# Patient Record
Sex: MALE | Race: WHITE | NOT HISPANIC OR LATINO | Employment: FULL TIME | URBAN - METROPOLITAN AREA
[De-identification: names, ages, dates, MRNs, and addresses within clinical notes are randomized per-mention and may not be internally consistent; named-entity substitution may affect disease eponyms.]

---

## 2017-03-12 ENCOUNTER — APPOINTMENT (OUTPATIENT)
Dept: GENERAL RADIOLOGY | Facility: CLINIC | Age: 47
End: 2017-03-12
Attending: EMERGENCY MEDICINE
Payer: COMMERCIAL

## 2017-03-12 ENCOUNTER — HOSPITAL ENCOUNTER (EMERGENCY)
Facility: CLINIC | Age: 47
Discharge: HOME | End: 2017-03-12
Attending: EMERGENCY MEDICINE | Admitting: EMERGENCY MEDICINE
Payer: COMMERCIAL

## 2017-03-12 VITALS
WEIGHT: 200 LBS | DIASTOLIC BLOOD PRESSURE: 121 MMHG | TEMPERATURE: 98.1 F | RESPIRATION RATE: 16 BRPM | HEART RATE: 109 BPM | SYSTOLIC BLOOD PRESSURE: 178 MMHG | OXYGEN SATURATION: 95 %
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
## 2017-03-12 DIAGNOSIS — S22.41XA CLOSED FRACTURE OF MULTIPLE RIBS OF RIGHT SIDE, INITIAL ENCOUNTER: ICD-10-CM

## 2017-03-12 PROCEDURE — 99284 EMERGENCY DEPT VISIT MOD MDM: CPT | Performed by: EMERGENCY MEDICINE

## 2017-03-12 PROCEDURE — 99283 EMERGENCY DEPT VISIT LOW MDM: CPT | Mod: 25

## 2017-03-12 PROCEDURE — 71010 XR CHEST 1 VW: CPT | Mod: TC

## 2017-03-12 PROCEDURE — 94640 AIRWAY INHALATION TREATMENT: CPT

## 2017-03-12 PROCEDURE — 25000125 ZZHC RX 250: Performed by: EMERGENCY MEDICINE

## 2017-03-12 PROCEDURE — 40000275 ZZH STATISTIC RCP TIME EA 10 MIN

## 2017-03-12 PROCEDURE — 25000132 ZZH RX MED GY IP 250 OP 250 PS 637: Performed by: EMERGENCY MEDICINE

## 2017-03-12 PROCEDURE — 71101 X-RAY EXAM UNILAT RIBS/CHEST: CPT | Mod: TC,RT

## 2017-03-12 RX ORDER — LIDOCAINE 50 MG/G
1 PATCH TOPICAL EVERY 24 HOURS
Qty: 10 PATCH | Refills: 0 | Status: SHIPPED | OUTPATIENT
Start: 2017-03-12 | End: 2017-03-22

## 2017-03-12 RX ORDER — ALBUTEROL SULFATE 90 UG/1
2 AEROSOL, METERED RESPIRATORY (INHALATION) EVERY 4 HOURS PRN
Qty: 1 INHALER | Refills: 0 | Status: SHIPPED | OUTPATIENT
Start: 2017-03-12

## 2017-03-12 RX ORDER — HYDROCODONE BITARTRATE AND ACETAMINOPHEN 5; 325 MG/1; MG/1
2 TABLET ORAL ONCE
Status: COMPLETED | OUTPATIENT
Start: 2017-03-12 | End: 2017-03-12

## 2017-03-12 RX ORDER — IPRATROPIUM BROMIDE AND ALBUTEROL SULFATE 2.5; .5 MG/3ML; MG/3ML
3 SOLUTION RESPIRATORY (INHALATION) ONCE
Status: COMPLETED | OUTPATIENT
Start: 2017-03-12 | End: 2017-03-12

## 2017-03-12 RX ORDER — HYDROCODONE BITARTRATE AND ACETAMINOPHEN 5; 325 MG/1; MG/1
1-2 TABLET ORAL EVERY 6 HOURS PRN
Qty: 20 TABLET | Refills: 0 | Status: SHIPPED | OUTPATIENT
Start: 2017-03-12 | End: 2017-03-17

## 2017-03-12 RX ORDER — LIDOCAINE 50 MG/G
1 PATCH TOPICAL ONCE
Status: COMPLETED | OUTPATIENT
Start: 2017-03-12 | End: 2017-03-12

## 2017-03-12 RX ADMIN — LIDOCAINE 1 PATCH: 50 PATCH CUTANEOUS at 09:50

## 2017-03-12 RX ADMIN — HYDROCODONE BITARTRATE AND ACETAMINOPHEN 2 TABLET: 5; 325 TABLET ORAL at 09:15

## 2017-03-12 RX ADMIN — IPRATROPIUM BROMIDE AND ALBUTEROL SULFATE 3 ML: .5; 3 SOLUTION RESPIRATORY (INHALATION) at 09:26

## 2017-03-12 NOTE — PROGRESS NOTES
S  Respiratory Care B  Rib Fx A  Duoneb treatment given with some increase in aeration; Sa02 %. Incentive Spirometry ordered. R  Instructed patient on use of IS. Fair effort and able to achieve 1000 ml. Advised patient to perform maneuver hourly to stimulate cough and deep breathing.

## 2017-03-12 NOTE — ED AVS SNAPSHOT
[facility] Emergency Department    [address] [place] [address]    Phone:  [phone]    Fax:  [phone]                                       Osvaldo Mitchell   MRN: [id number]    Department:  [facility] Emergency Department   Date of Visit:  3/12/2017           After Visit Summary Signature Page     I have received my discharge instructions, and my questions have been answered. I have discussed any challenges I see with this plan with the nurse or doctor.    ..........................................................................................................................................  Patient/Patient Representative Signature      ..........................................................................................................................................  Patient Representative Print Name and Relationship to Patient    ..................................................               ................................................  Date                                            Time    ..........................................................................................................................................  Reviewed by Signature/Title    ...................................................              ..............................................  Date                                                            Time

## 2017-03-12 NOTE — LETTER
[facility] EMERGENCY DEPARTMENT  [address] [place] [address]  [phone]    2017    Osvaldo Mitchell  [address] [facility]  [place] [address] [phone] (home) [phone] (work)    : 1970      To Whom it may concern:    Osvaldo Mitchell was seen in our Emergency Department today, 2017.  He will need follow-up with clinic provider this week.  He may return to work after cleared in clinic.  Not lifting > 10 lbs.     Sincerely,        Cristina Glover MD

## 2017-03-12 NOTE — ED AVS SNAPSHOT
[facility] Emergency Department    [address] [place] [address]    Phone:  [phone]    Fax:  [phone]                                       Osvaldo Mitchell   MRN: [id number]    Department:  [facility] Emergency Department   Date of Visit:  3/12/2017           Patient Information     Date Of Birth          1970        Your diagnoses for this visit were:     Closed fracture of multiple ribs of right side, initial encounter     Fall at home, initial encounter        You were seen by Cristina Glover MD.      Follow-up Information     Follow up with Clinic, [facility].    Contact information:    [phone]          Discharge Instructions       There are 2 broken ribs.    Vicodin if needed for pain.  This should also help with cough.  It may cause constipation.  Start stool softeners or Miralax for constipation.    Ibuprofen or Aleve for pain and inflammation.    You can try ice or heat to the painful area.  Use the rib belt for comfort if desired.    Use the incentive spirometer to prevent any mild lung collapse or pneumonia.    Inhalers as needed.    Try a Lidoderm patch over the area for pain. If your insurance does not cover this, you can buy them over-the-counter. Talk with your pharmacist.    This is the perfect time to stop smoking.    See clinic provider this week.    Return for concerns.    I hope you heal quickly!!          Discharge References/Attachments     RIB FRACTURE (ENGLISH)      Future Appointments        Provider Department Dept Phone Center    3/17/2017 1:00 PM Isauro Snow MD [facility] [phone] [facility]      24 Hour Appointment Hotline       To make an appointment at any [facility], call [phone] ([phone]). If you don't have a family doctor or clinic, we will help you find one. [facility] are conveniently located to serve the needs of you and your family.          ED  Discharge Orders     RIB BELT ELASTIC - MALE M                    Review of your medicines      START taking        Dose / Directions Last dose taken    albuterol 108 (90 BASE) MCG/ACT Inhaler   Commonly known as:  albuterol   Dose:  2 puff   Quantity:  1 Inhaler        Inhale 2 puffs into the lungs every 4 hours as needed for shortness of breath / dyspnea   Refills:  0        HYDROcodone-acetaminophen 5-325 MG per tablet   Commonly known as:  NORCO   Dose:  1-2 tablet   Quantity:  20 tablet        Take 1-2 tablets by mouth every 6 hours as needed for moderate to severe pain   Refills:  0        lidocaine 5 % Patch   Commonly known as:  LIDODERM   Dose:  1 patch   Quantity:  10 patch        Place 1 patch onto the skin every 24 hours for 10 days   Refills:  0          Our records show that you are taking the medicines listed below. If these are incorrect, please call your family doctor or clinic.        Dose / Directions Last dose taken    ALEVE PO        Refills:  0        CLARITIN 10 MG tablet   Quantity:  30   Generic drug:  loratadine        1 TAB PO QD (Once per day) as needed for ALLERGY SYMPTOMS   Refills:  0        IBUPROFEN PO        Refills:  0        RHINOCORT AQUA 32 MCG/ACT spray   Quantity:  1   Generic drug:  budesonide        2 sprays each nostril qam and qpm   Refills:  0                Prescriptions were sent or printed at these locations (3 Prescriptions)                   [facility] [place], [place] - [address] [place] [address]    [address] , [facility] [address]    Telephone:  [phone]   Fax:  [phone]   Hours:                  E-Prescribed (2 of 3)         lidocaine (LIDODERM) 5 % Patch               albuterol (ALBUTEROL) 108 (90 BASE) MCG/ACT Inhaler                 Printed at Department/Unit printer (1 of 3)         HYDROcodone-acetaminophen (NORCO) 5-325 MG per tablet                Procedures and tests performed during your visit     Incentive spirometry nursing    Ribs XR,  "unilat 3 views + PA chest, right    XR Chest 1 View      Orders Needing Specimen Collection     None      Pending Results     No orders found from 3/10/2017 to 3/13/2017.            Pending Culture Results     No orders found from 3/10/2017 to 3/13/2017.            Thank you for choosing [facility]       Thank you for choosing [facility] for your care. Our goal is always to provide you with excellent care. Hearing back from our patients is one way we can continue to improve our services. Please take a few minutes to complete the written survey that you may receive in the mail after you visit with us. Thank you!        AirInSpace[employer] TV Information     Yobongo lets you send messages to your doctor, view your test results, renew your prescriptions, schedule appointments and more. To sign up, go to [email or web address] . Click on \"Log in\" on the left side of the screen, which will take you to the Welcome page. Then click on \"Sign up Now\" on the right side of the page.     You will be asked to enter the access code listed below, as well as some personal information. Please follow the directions to create your username and password.     Your access code is: [email or web address]  Expires: 6/10/2017 10:25 AM     Your access code will  in 90 days. If you need help or a new code, please call your [facility] clinic or [phone].        Care EveryWhere ID     This is your Care EveryWhere ID. This could be used by other organizations to access your [facility] medical records  [id number]        After Visit Summary       This is your record. Keep this with you and show to your community pharmacist(s) and doctor(s) at your next visit.                  "

## 2017-03-12 NOTE — DISCHARGE INSTRUCTIONS
There are 2 broken ribs.    Vicodin if needed for pain.  This should also help with cough.  It may cause constipation.  Start stool softeners or Miralax for constipation.    Ibuprofen or Aleve for pain and inflammation.    You can try ice or heat to the painful area.  Use the rib belt for comfort if desired.    Use the incentive spirometer to prevent any mild lung collapse or pneumonia.    Inhalers as needed.    Try a Lidoderm patch over the area for pain. If your insurance does not cover this, you can buy them over-the-counter. Talk with your pharmacist.    This is the perfect time to stop smoking.    See clinic provider this week.    Return for concerns.    I hope you heal quickly!!

## 2017-03-12 NOTE — ED PROVIDER NOTES
History     Chief Complaint   Patient presents with     Fall     Back Pain     The history is provided by the patient and medical records.     This is a 46-year-old male, basically healthy, presenting with rib pain after a fall. Patient slipped on some stairs 2 nights ago and fell onto his right side. He notes pain in the posterior ribs. He has used ibuprofen and Aleve with only mild relief. He has had difficulty coughing and notes pain with movement, breathing, coughing, sitting. Because it hurts to move and sit he has had some constipation. He denies any urinary symptoms or blood in the urine. No fevers or chills. No other injuries.  He has had some nasal congestion and is using a nasal spray. Patient smokes pack per day.    I have reviewed the Medications, Allergies, Past Medical and Surgical History, and Social History in the Epic system.    Review of Systems   All other ROS reviewed and are negative or non-contributory except as stated in HPI.     Physical Exam   BP: (!) 178/121  Pulse: 105  Temp: 98.1  F (36.7  C)  Resp: 16  Weight: 90.7 kg (200 lb)  SpO2: 95 %  Physical Exam   Constitutional: He appears well-developed and well-nourished.   Tall, thin male landing in the room   HENT:   Head: Normocephalic.   Nose: Nose normal.   Eyes: Conjunctivae and EOM are normal.   Neck: Normal range of motion.   Cardiovascular: Regular rhythm, normal heart sounds and intact distal pulses.    Tachycardia   Pulmonary/Chest:           Rhonchi, tight breath sounds, end expiratory wheeze.  Splinting   Abdominal: Soft. There is no tenderness.   Musculoskeletal: Normal range of motion.   Neurological: He is alert.   Skin: Skin is warm and dry. He is not diaphoretic.   Psychiatric: He has a normal mood and affect. His behavior is normal.   Vitals reviewed.      ED Course (with Medical Decision Making)    Pt seen and examined by me.  RN and EPIC notes reviewed.      Patient with fall and rib pain. I suspect he may have a rib  fracture. He also is a smoker and has a lot of rhonchi and tight breath sounds. Plan to get x-ray, Vicodin for pain, DuoNeb.     Patient had some improvement in his breathing after the neb. X-rays were done which show 2 mildly displaced fractures.  By my read, there may also be a nondisplaced fracture on the right 8th rib. Results discussed with the patient. Lidoderm patch placed over the area of greatest pain.  We discussed incentive spirometer and one was provided for him.     Plan is for discharge home. Rest, ice or heat to the painful area. Lidoderm patch. Albuterol inhaler as needed for shortness of breath and cough. Vicodin for severe pain. Okay to add ibuprofen or Aleve as needed. He was also given a rib belt to use for comfort as needed. Work note provided. He needs a follow-up visit to primary care provider for further work restrictions.        Procedures  Results for orders placed or performed during the hospital encounter of 03/12/17   Ribs XR, unilat 3 views + PA chest, right    Narrative    XR RIBS & CHEST RT 3VW, XR CHEST 1 VW 3/12/2017 9:24 AM     HISTORY: fall; pain    COMPARISON: None      Impression    IMPRESSION: There are mildly displaced fractures of the right ninth  and 10th ribs. There is no evidence of pneumothorax. Small amount of  atelectasis at the right lung base. The left lung is clear.    BAILEY SOUSA MD   XR Chest 1 View    Narrative    XR RIBS & CHEST RT 3VW, XR CHEST 1 VW 3/12/2017 9:24 AM     HISTORY: fall; pain    COMPARISON: None      Impression    IMPRESSION: There are mildly displaced fractures of the right ninth  and 10th ribs. There is no evidence of pneumothorax. Small amount of  atelectasis at the right lung base. The left lung is clear.    BAILEY SOUSA MD        Assessments & Plan     I have reviewed the findings, diagnosis, plan and need for follow up with the patient.    Discharge Medication List as of 3/12/2017 10:53 AM      START taking these medications    Details    HYDROcodone-acetaminophen (NORCO) 5-325 MG per tablet Take 1-2 tablets by mouth every 6 hours as needed for moderate to severe pain, Disp-20 tablet, R-0, Local Print      lidocaine (LIDODERM) 5 % Patch Place 1 patch onto the skin every 24 hours for 10 daysDisp-10 patch, [id number]      albuterol (ALBUTEROL) 108 (90 BASE) MCG/ACT Inhaler Inhale 2 puffs into the lungs every 4 hours as needed for shortness of breath / dyspnea, Disp-1 Inhaler, R-0, E-Prescribe             Final diagnoses:   Closed fracture of multiple ribs of right side, initial encounter   Fall at home, initial encounter     Disposition: Patient discharged home in stable condition in the care of family. Plan as above. Return for concerns. Smoking cessation discussed.    Note: Chart documentation done in part with Dragon Voice Recognition software. Although reviewed after completion, some word and grammatical errors may remain.     3/12/2017   [facility] EMERGENCY DEPARTMENT     Cristina Glover MD  03/12/17 3060

## 2017-03-17 ENCOUNTER — OFFICE VISIT (OUTPATIENT)
Dept: FAMILY MEDICINE | Facility: CLINIC | Age: 47
End: 2017-03-17
Payer: COMMERCIAL

## 2017-03-17 VITALS
OXYGEN SATURATION: 99 % | TEMPERATURE: 97.8 F | DIASTOLIC BLOOD PRESSURE: 88 MMHG | WEIGHT: 201 LBS | HEART RATE: 84 BPM | SYSTOLIC BLOOD PRESSURE: 144 MMHG | RESPIRATION RATE: 18 BRPM

## 2017-03-17 DIAGNOSIS — S22.41XD CLOSED FRACTURE OF MULTIPLE RIBS OF RIGHT SIDE WITH ROUTINE HEALING, SUBSEQUENT ENCOUNTER: Primary | ICD-10-CM

## 2017-03-17 PROCEDURE — 99213 OFFICE O/P EST LOW 20 MIN: CPT | Performed by: FAMILY MEDICINE

## 2017-03-17 RX ORDER — AZITHROMYCIN 250 MG/1
TABLET, FILM COATED ORAL
Qty: 6 TABLET | Refills: 0 | Status: SHIPPED | OUTPATIENT
Start: 2017-03-17

## 2017-03-17 RX ORDER — HYDROCODONE BITARTRATE AND ACETAMINOPHEN 5; 325 MG/1; MG/1
1-2 TABLET ORAL EVERY 6 HOURS PRN
Qty: 30 TABLET | Refills: 0 | Status: SHIPPED | OUTPATIENT
Start: 2017-03-17

## 2017-03-17 NOTE — LETTER
[address] [address]  Phone: [phone]  Fax: [phone]    March 17, 2017        Osvaldo Mitchell  [address] [address] [place] [address]          To whom it may concern:    RE: Osvaldo Mitchell    Patient was seen and treated today at our clinic and missed work. Please continue to excuse him from work through March 24.    Please contact me for questions or concerns.      Sincerely,        Isauro Snow MD

## 2017-03-17 NOTE — NURSING NOTE
Chief Complaint   Patient presents with     Hospital F/U       Initial /88 (Cuff Size: Adult Regular)  Pulse 84  Temp 97.8  F (36.6  C) (Temporal)  Resp 18  Wt 201 lb (91.2 kg)  SpO2 99% There is no height or weight on file to calculate BMI.  Medication Reconciliation: complete

## 2017-03-17 NOTE — PROGRESS NOTES
SUBJECTIVE:                                                    Osvaldo Mitchell is a 46 year old male who presents to clinic today for the following health issues:      ED/UC Followup:    Facility:  [facility]  Date of visit: 3/12/17  Reason for visit: Fall with broken ribs  Current Status: Improved some, but he has had a cough so it is causing a lot of pain           Problem list and histories reviewed & adjusted, as indicated.  Additional history: as documented        Reviewed and updated as needed this visit by clinical staff  Tobacco  Allergies  Meds  Soc Hx      Reviewed and updated as needed this visit by Provider        SUBJECTIVE:  Osvaldo  is a 46 year old male who presents for:  Follow-up of broken ribs. A week ago he fell down some steps in 2 days later presented to the emergency room in pain and it was found out he had a nondisplaced fracture of ninth and 10th rib on the right side. He is today out of pain medication he was on some Vicodin and has developed more of a cough. He did have some trouble with constipation but has been using stool softeners and laxatives. No fever. No shortness of breath. Doesn't feel he is able to go back to work yet it's hard for him to get in and out of a car and even bend over to tie his shoes.    No past medical history on file.  No past surgical history on file.  Social History   Substance Use Topics     Smoking status: Current Every Day Smoker     Packs/day: 1.00     Smokeless tobacco: Never Used     Alcohol use 13.2 oz/week     22 Glasses of wine per week     Current Outpatient Prescriptions   Medication Sig Dispense Refill     HYDROcodone-acetaminophen (NORCO) 5-325 MG per tablet Take 1-2 tablets by mouth every 6 hours as needed for moderate to severe pain 30 tablet 0     azithromycin (ZITHROMAX) 250 MG tablet Two tablets first day, then one tablet daily for four days. 6 tablet 0     Naproxen Sodium (ALEVE PO)        IBUPROFEN PO        lidocaine (LIDODERM) 5 %  Patch Place 1 patch onto the skin every 24 hours for 10 days 10 patch 0     albuterol (ALBUTEROL) 108 (90 BASE) MCG/ACT Inhaler Inhale 2 puffs into the lungs every 4 hours as needed for shortness of breath / dyspnea 1 Inhaler 0     CLARITIN 10 MG OR TABS 1 TAB PO QD (Once per day) as needed for ALLERGY SYMPTOMS 30 0     RHINOCORT AQUA 32 MCG/ACT NA SUSP 2 sprays each nostril qam and qpm 1 0       REVIEW OF SYSTEMS:   5 point ROS negative except as noted above in HPI, including Gen., Resp, CV, GI &  system review.     OBJECTIVE:  Vitals: /88 (Cuff Size: Adult Regular)  Pulse 84  Temp 97.8  F (36.6  C) (Temporal)  Resp 18  Wt 201 lb (91.2 kg)  SpO2 99%  BMI= There is no height or weight on file to calculate BMI.  Appears in no distress. Prefers the standing position. Some discoloration noted on his right lower back flank area. Tender here to palpation. Lungs with just a few crackles in this area otherwise good air exchange.    ASSESSMENT:  Right-sided rib fractures    PLAN:  Renewed his Vicodin gave him 30 of them. We'll put him on a Z-[place]. She is coughing up some phlegm. He is to use stool softeners continuously. Follow-up in a week. He'll be off work through next week.        Isauro Snow MD  [facility]

## 2017-03-17 NOTE — MR AVS SNAPSHOT
"              After Visit Summary   3/17/2017    Osvaldo Mitchell    MRN: [id number]           Patient Information     Date Of Birth          1970        Visit Information        Provider Department      3/17/2017 1:00 PM Isauro Snow MD [facility]        Today's Diagnoses     Closed fracture of multiple ribs of right side with routine healing, subsequent encounter    -  1       Follow-ups after your visit        Who to contact     If you have questions or need follow up information about today's clinic visit or your schedule please contact [facility] directly at [phone].  Normal or non-critical lab and imaging results will be communicated to you by Charitashart, letter or phone within 4 business days after the clinic has received the results. If you do not hear from us within 7 days, please contact the clinic through Charitashart or phone. If you have a critical or abnormal lab result, we will notify you by phone as soon as possible.  Submit refill requests through youbeQ [employer] Life or call your pharmacy and they will forward the refill request to us. Please allow 3 business days for your refill to be completed.          Additional Information About Your Visit        MyChart Information     [employer]Q [employer] lets you send messages to your doctor, view your test results, renew your prescriptions, schedule appointments and more. To sign up, go to [email or web address] - [employer] . Click on \"Log in\" on the left side of the screen, which will take you to the Welcome page. Then click on \"Sign up Now\" on the right side of the page.     You will be asked to enter the access code listed below, as well as some personal information. Please follow the directions to create your username and password.     Your access code is: [email or web address]  Expires: 6/10/2017 10:25 AM     Your access code will  in 90 days. If you need help or a new code, please call your [facility] or [phone].        Care EveryWhere ID     " This is your Care EveryWhere ID. This could be used by other organizations to access your [facility] medical records  [id number]        Your Vitals Were     Pulse Temperature Respirations Pulse Oximetry          84 97.8  F (36.6  C) (Temporal) 18 99%         Blood Pressure from Last 3 Encounters:   03/17/17 144/88   03/12/17 (!) 178/121    Weight from Last 3 Encounters:   03/17/17 201 lb (91.2 kg)   03/12/17 200 lb (90.7 kg)              Today, you had the following     No orders found for display         Today's Medication Changes          These changes are accurate as of: 3/17/17  2:17 PM.  If you have any questions, ask your nurse or doctor.               Start taking these medicines.        Dose/Directions    azithromycin 250 MG tablet   Commonly known as:  ZITHROMAX   Used for:  Closed fracture of multiple ribs of right side with routine healing, subsequent encounter   Started by:  Isauro Snow MD        Two tablets first day, then one tablet daily for four days.   Quantity:  6 tablet   Refills:  0            Where to get your medicines      These medications were sent to [facility], [address] [place] [facility] [address] [place] [facility] [address]     Phone:  [phone]     azithromycin 250 MG tablet         Some of these will need a paper prescription and others can be bought over the counter.  Ask your nurse if you have questions.     Bring a paper prescription for each of these medications     HYDROcodone-acetaminophen 5-325 MG per tablet                Primary Care Provider Office Phone # Fax #    Isauro Snow [phone]       [place] [address] [facility]   [facility] [place] [address]        Thank you!     Thank you for choosing [facility]  for your care. Our goal is always to provide you with excellent care. Hearing back from our patients is one way we can continue to improve our services. Please take a few minutes to complete  the written survey that you may receive in the mail after your visit with us. Thank you!             Your Updated Medication List - Protect others around you: Learn how to safely use, store and throw away your medicines at www.disposemymeds.org.          This list is accurate as of: 3/17/17  2:17 PM.  Always use your most recent med list.                   Brand Name Dispense Instructions for use    albuterol 108 (90 BASE) MCG/ACT Inhaler    albuterol    1 Inhaler    Inhale 2 puffs into the lungs every 4 hours as needed for shortness of breath / dyspnea       ALEVE PO          azithromycin 250 MG tablet    ZITHROMAX    6 tablet    Two tablets first day, then one tablet daily for four days.       CLARITIN 10 MG tablet   Generic drug:  loratadine     30    1 TAB PO QD (Once per day) as needed for ALLERGY SYMPTOMS       HYDROcodone-acetaminophen 5-325 MG per tablet    NORCO    30 tablet    Take 1-2 tablets by mouth every 6 hours as needed for moderate to severe pain       IBUPROFEN PO          lidocaine 5 % Patch    LIDODERM    10 patch    Place 1 patch onto the skin every 24 hours for 10 days       RHINOCORT AQUA 32 MCG/ACT spray   Generic drug:  budesonide     1    2 sprays each nostril qam and qpm

## 2017-03-23 ENCOUNTER — TELEPHONE (OUTPATIENT)
Dept: FAMILY MEDICINE | Facility: CLINIC | Age: 47
End: 2017-03-23

## 2017-03-23 NOTE — TELEPHONE ENCOUNTER
Letter completed. Called patient to find out where he wants it sent or if he is going to pick it up in the clinic. Left VM for patient to call back to notify us. NAYA/BASSAM

## 2017-03-23 NOTE — TELEPHONE ENCOUNTER
Reason for Call:  Other      Detailed comments: Requesting a return to work note with a start date of 3/27.   Phone Number Patient can be reached at: Other phone number:[phone]      Best Time: any     Can we leave a detailed message on this number? YES    Call taken on 3/23/2017 at 9:16 AM by Nettie Snow

## 2017-03-23 NOTE — TELEPHONE ENCOUNTER
Patient called to fu on message from earlier today.  Patient was advised of 3 business days but requesting it be completed on 3.24.  Thank you,  Opal Yang  Patient Representative

## 2017-03-23 NOTE — LETTER
[address] [id number] [phone]    March 23, 2017        Osvaldo GARY Stephen  [id number] [facility] [address]          To whom it may concern:    Osvaldo may return to work on 3/27/17 without restrictions.    Please contact me for questions or concerns.        Sincerely,        Isauro Snow MD

## 2023-06-06 DIAGNOSIS — I10 ESSENTIAL HYPERTENSION: Primary | ICD-10-CM

## 2023-06-20 ENCOUNTER — TELEPHONE (OUTPATIENT)
Dept: CARDIOLOGY | Facility: CLINIC | Age: 53
End: 2023-06-20
Payer: COMMERCIAL

## 2023-06-20 NOTE — TELEPHONE ENCOUNTER
[facility] Call Center    Phone Message    May a detailed message be left on voicemail: yes     Reason for Call: Other: Scheduled in error. Attempted to call pt back to explain and was no answer. Pt would like the EKG prior to his Xray on the 22nd. Originally scheduled at 1:15. Please call pt to schedule. Attempted to call [place] and there was no answer. Thank you     Action Taken: Message routed to:  Other: Cardiology    Travel Screening: Not Applicable       Thank you!  [facility]

## 2023-06-20 NOTE — TELEPHONE ENCOUNTER
P [facility] Specialty Scheduling  Caller: Unspecified (Today,  9:57 AM)  H # no longer in service unable to reach pt to schedule EKG

## 2023-06-22 ENCOUNTER — HOSPITAL ENCOUNTER (OUTPATIENT)
Dept: GENERAL RADIOLOGY | Facility: CLINIC | Age: 53
Discharge: HOME | End: 2023-06-22
Attending: NURSE PRACTITIONER
Payer: COMMERCIAL

## 2023-06-22 ENCOUNTER — HOSPITAL ENCOUNTER (OUTPATIENT)
Dept: CARDIOLOGY | Facility: CLINIC | Age: 53
Discharge: HOME | End: 2023-06-22
Attending: NURSE PRACTITIONER
Payer: COMMERCIAL

## 2023-06-22 DIAGNOSIS — I10 ESSENTIAL HYPERTENSION: Primary | ICD-10-CM

## 2023-06-22 DIAGNOSIS — I10 ESSENTIAL HYPERTENSION: ICD-10-CM

## 2023-06-22 PROCEDURE — 93005 ELECTROCARDIOGRAM TRACING: CPT | Performed by: REHABILITATION PRACTITIONER

## 2023-06-22 PROCEDURE — 71046 X-RAY EXAM CHEST 2 VIEWS: CPT
